# Patient Record
Sex: FEMALE | Race: WHITE | URBAN - METROPOLITAN AREA
[De-identification: names, ages, dates, MRNs, and addresses within clinical notes are randomized per-mention and may not be internally consistent; named-entity substitution may affect disease eponyms.]

---

## 2019-02-16 ENCOUNTER — OFFICE VISIT (OUTPATIENT)
Dept: FAMILY MEDICINE CLINIC | Facility: CLINIC | Age: 54
End: 2019-02-16

## 2019-02-16 VITALS
OXYGEN SATURATION: 99 % | WEIGHT: 194 LBS | HEART RATE: 90 BPM | HEIGHT: 67.72 IN | SYSTOLIC BLOOD PRESSURE: 118 MMHG | TEMPERATURE: 98 F | DIASTOLIC BLOOD PRESSURE: 76 MMHG | BODY MASS INDEX: 29.75 KG/M2 | RESPIRATION RATE: 18 BRPM

## 2019-02-16 DIAGNOSIS — Z02.9 ENCOUNTERS FOR ADMINISTRATIVE PURPOSE: Primary | ICD-10-CM

## 2019-02-16 NOTE — PROGRESS NOTES
Pt presents to [facility] for evaluation of occipital HA, nausea, blurred vision and bilateral ear pressure. No sinus congestion. Hx of similar sx 2 years ago and pt was told she had a circulatory abnormality and that was the etiology of sxs  Here from [place].  D